# Patient Record
Sex: MALE | Race: WHITE | NOT HISPANIC OR LATINO | URBAN - METROPOLITAN AREA
[De-identification: names, ages, dates, MRNs, and addresses within clinical notes are randomized per-mention and may not be internally consistent; named-entity substitution may affect disease eponyms.]

---

## 2017-02-13 ENCOUNTER — OFFICE VISIT (OUTPATIENT)
Dept: FAMILY MEDICINE CLINIC | Facility: CLINIC | Age: 32
End: 2017-02-13

## 2017-02-13 VITALS
BODY MASS INDEX: 27.74 KG/M2 | DIASTOLIC BLOOD PRESSURE: 70 MMHG | WEIGHT: 183 LBS | SYSTOLIC BLOOD PRESSURE: 110 MMHG | RESPIRATION RATE: 16 BRPM | OXYGEN SATURATION: 98 % | HEART RATE: 80 BPM | HEIGHT: 68 IN

## 2017-02-13 DIAGNOSIS — Z20.2 EXPOSURE TO STD: ICD-10-CM

## 2017-02-13 DIAGNOSIS — D22.9 MULTIPLE ATYPICAL NEVI: ICD-10-CM

## 2017-02-13 DIAGNOSIS — Z00.00 ROUTINE ADULT HEALTH MAINTENANCE: Primary | ICD-10-CM

## 2017-02-13 LAB
ALBUMIN SERPL-MCNC: 4.8 G/DL (ref 3.5–5.2)
ALBUMIN/GLOB SERPL: 2.1 G/DL
ALP SERPL-CCNC: 63 U/L (ref 39–117)
ALT SERPL-CCNC: 31 U/L (ref 1–41)
AST SERPL-CCNC: 19 U/L (ref 1–40)
BASOPHILS # BLD AUTO: 0.03 10*3/MM3 (ref 0–0.2)
BASOPHILS NFR BLD AUTO: 0.4 % (ref 0–1.5)
BILIRUB SERPL-MCNC: 0.7 MG/DL (ref 0.1–1.2)
BUN SERPL-MCNC: 16 MG/DL (ref 6–20)
BUN/CREAT SERPL: 14.4 (ref 7–25)
CALCIUM SERPL-MCNC: 9.4 MG/DL (ref 8.6–10.5)
CHLORIDE SERPL-SCNC: 101 MMOL/L (ref 98–107)
CHOLEST SERPL-MCNC: 219 MG/DL (ref 0–200)
CO2 SERPL-SCNC: 25.7 MMOL/L (ref 22–29)
CREAT SERPL-MCNC: 1.11 MG/DL (ref 0.76–1.27)
EOSINOPHIL # BLD AUTO: 0.24 10*3/MM3 (ref 0–0.7)
EOSINOPHIL NFR BLD AUTO: 2.9 % (ref 0.3–6.2)
ERYTHROCYTE [DISTWIDTH] IN BLOOD BY AUTOMATED COUNT: 12.5 % (ref 11.5–14.5)
GLOBULIN SER CALC-MCNC: 2.3 GM/DL
GLUCOSE SERPL-MCNC: 125 MG/DL (ref 65–99)
HCT VFR BLD AUTO: 46.5 % (ref 40.4–52.2)
HDLC SERPL-MCNC: 50 MG/DL (ref 40–60)
HGB BLD-MCNC: 15.4 G/DL (ref 13.7–17.6)
IMM GRANULOCYTES # BLD: 0 10*3/MM3 (ref 0–0.03)
IMM GRANULOCYTES NFR BLD: 0 % (ref 0–0.5)
LDLC SERPL CALC-MCNC: 141 MG/DL (ref 0–100)
LDLC/HDLC SERPL: 2.82 {RATIO}
LYMPHOCYTES # BLD AUTO: 2.28 10*3/MM3 (ref 0.9–4.8)
LYMPHOCYTES NFR BLD AUTO: 27.7 % (ref 19.6–45.3)
MCH RBC QN AUTO: 31.1 PG (ref 27–32.7)
MCHC RBC AUTO-ENTMCNC: 33.1 G/DL (ref 32.6–36.4)
MCV RBC AUTO: 93.9 FL (ref 79.8–96.2)
MONOCYTES # BLD AUTO: 0.27 10*3/MM3 (ref 0.2–1.2)
MONOCYTES NFR BLD AUTO: 3.3 % (ref 5–12)
NEUTROPHILS # BLD AUTO: 5.42 10*3/MM3 (ref 1.9–8.1)
NEUTROPHILS NFR BLD AUTO: 65.7 % (ref 42.7–76)
PLATELET # BLD AUTO: 228 10*3/MM3 (ref 140–500)
POTASSIUM SERPL-SCNC: 4.3 MMOL/L (ref 3.5–5.2)
PROT SERPL-MCNC: 7.1 G/DL (ref 6–8.5)
RBC # BLD AUTO: 4.95 10*6/MM3 (ref 4.6–6)
SODIUM SERPL-SCNC: 141 MMOL/L (ref 136–145)
TRIGL SERPL-MCNC: 139 MG/DL (ref 0–150)
TSH SERPL DL<=0.005 MIU/L-ACNC: 1.48 MIU/ML (ref 0.27–4.2)
VLDLC SERPL CALC-MCNC: 27.8 MG/DL (ref 5–40)
WBC # BLD AUTO: 8.24 10*3/MM3 (ref 4.5–10.7)

## 2017-02-13 PROCEDURE — 99395 PREV VISIT EST AGE 18-39: CPT | Performed by: NURSE PRACTITIONER

## 2017-02-13 NOTE — PATIENT INSTRUCTIONS
Moles  Moles are usually harmless growths on the skin. They are accumulations of color (pigment) cells in the skin that:   · Can be various colors, from light brown to black.  · Can appear anywhere on the body.  · May remain flat or become raised.  · May contain hairs.  · May remain smooth or develop wrinkling.  Most moles are not cancerous (benign). However, some moles may develop changes and become cancerous. It is important to check your moles every month. If you check your moles regularly, you will be able to notice any changes that may occur.   CAUSES   Moles occur when skin cells grow together in clusters instead of spreading out in the skin as they normally do. The reason for this clustering is unknown.  DIAGNOSIS   Your caregiver will perform a skin examination to diagnose your mole.   TREATMENT   Moles usually do not require treatment. If a mole becomes worrisome, your caregiver may choose to take a sample of the mole or remove it entirely, and then send it to a lab for examination.   HOME CARE INSTRUCTIONS  · Check your mole(s) monthly for changes that may indicate skin cancer. These changes can include:    A change in size.    A change in color. Note that moles tend to darken during pregnancy or when taking birth control pills (oral contraception).    A change in shape.    A change in the border of the mole.  · Wear sunscreen (with an SPF of at least 30) when you spend long periods of time outside. Reapply the sunscreen every 2-3 hours.  · Schedule annual appointments with your skin doctor (dermatologist) if you have a large number of moles.  SEEK MEDICAL CARE IF:  · Your mole changes size, especially if it becomes larger than a pencil eraser.  · Your mole changes in color or develops more than one color.   · Your mole becomes itchy or bleeds.  · Your mole, or the skin near the mole, becomes painful, sore, red, or swollen.  · Your mole becomes scaly, sheds skin, or oozes fluid.   · Your mole develops  irregular borders.  · Your mole becomes flat or develops raised areas.  · Your mole becomes hard or soft.      This information is not intended to replace advice given to you by your health care provider. Make sure you discuss any questions you have with your health care provider.     Document Released: 09/12/2002 Document Revised: 09/11/2013 Document Reviewed: 07/01/2013  [employer] Interactive Patient Education ©2016 [employer]

## 2017-02-13 NOTE — PROGRESS NOTES
"  Subjective   Cadnido Fowler  is a 31 y.o. male.   He is here for check up and RHM. Pt is a  at [employer]. Pt also would like STD testing - pt is sexually active with woman. Pt wants moles checked by dermatology.     History of Present Illness     The following portions of the patient's history were reviewed and updated as appropriate: allergies, current medications, past family history, past medical history, past social history, past surgical history and problem list.    Review of Systems   Constitutional: Negative.    HENT: Negative.    Eyes: Negative.    Respiratory: Negative.    Cardiovascular: Negative.    Gastrointestinal: Negative.    Endocrine: Negative.    Genitourinary: Negative.    Musculoskeletal: Negative for neck stiffness.   Skin: Negative.    Allergic/Immunologic: Negative.    Neurological: Negative.    Hematological: Negative.    Psychiatric/Behavioral: Negative.    All other systems reviewed and are negative.      Objective   Vitals:    02/13/17 0829   BP: 110/70   Pulse: 80   Resp: 16   SpO2: 98%   Weight: 183 lb (83 kg)   Height: 68\" (172.7 cm)     Physical Exam   Constitutional: He is oriented to person, place, and time. He appears well-developed and well-nourished.   HENT:   Head: Normocephalic and atraumatic.   Eyes: Conjunctivae and EOM are normal. Pupils are equal, round, and reactive to light.   Neck: Normal range of motion. Neck supple. No thyromegaly present.   Cardiovascular: Normal rate, regular rhythm, normal heart sounds and intact distal pulses.  Exam reveals no gallop and no friction rub.    No murmur heard.  Pulmonary/Chest: Effort normal and breath sounds normal. No respiratory distress. He has no wheezes. He has no rales. He exhibits no tenderness.   Abdominal: Soft, non-tender, non-distended.   Musculoskeletal: Upper and Lower extremities have normal range of motion. He exhibits no edema.   Neurological: He is alert and oriented to person, place, and time.   Skin: Skin " is warm and dry. No rash noted.   Psychiatric: He has a normal mood and affect. His behavior is normal. Judgment and thought content normal.   Nursing note and vitals reviewed.  Pt has multiple atypical nevi to back and no irregular or bleeding.     Assessment/Plan   Candido Fowler was seen today for annual exam.    Orders Placed This Encounter   Procedures   • C Trachomatis / N Gonorrhoeae PCR   • Comprehensive Metabolic Panel   • Lipid Panel With LDL / HDL Ratio   • TSH   • Hepatitis Panel, Acute   • HIV-1 / O / 2 Ag / Antibody 4th Generation   • RPR   • Ambulatory Referral to Dermatology     Referral Priority:   Routine     Referral Type:   Consultation     Referral Reason:   Specialty Services Required     Referred to Provider:   Guy Underwood MD     Requested Specialty:   Dermatology     Number of Visits Requested:   1

## 2017-02-14 LAB
HAV IGM SERPL QL IA: NEGATIVE
HBV CORE IGM SERPL QL IA: NEGATIVE
HBV SURFACE AG SERPL QL IA: NEGATIVE
HCV AB S/CO SERPL IA: <0.1 S/CO RATIO (ref 0–0.9)
HIV 1+2 AB+HIV1 P24 AG SERPL QL IA: NON REACTIVE

## 2017-02-15 LAB
C TRACH RRNA SPEC QL NAA+PROBE: NEGATIVE
N GONORRHOEA RRNA SPEC QL NAA+PROBE: NEGATIVE
RPR SER QL: NORMAL

## 2017-03-31 ENCOUNTER — OFFICE VISIT (OUTPATIENT)
Dept: FAMILY MEDICINE CLINIC | Facility: CLINIC | Age: 32
End: 2017-03-31

## 2017-03-31 VITALS
BODY MASS INDEX: 28.67 KG/M2 | DIASTOLIC BLOOD PRESSURE: 80 MMHG | OXYGEN SATURATION: 98 % | WEIGHT: 189.2 LBS | SYSTOLIC BLOOD PRESSURE: 115 MMHG | HEIGHT: 68 IN | HEART RATE: 72 BPM

## 2017-03-31 DIAGNOSIS — H01.005 BLEPHARITIS OF LEFT LOWER EYELID, UNSPECIFIED TYPE: Primary | ICD-10-CM

## 2017-03-31 DIAGNOSIS — H61.23 IMPACTED CERUMEN OF BOTH EARS: ICD-10-CM

## 2017-03-31 DIAGNOSIS — H92.03 OTALGIA OF BOTH EARS: ICD-10-CM

## 2017-03-31 PROCEDURE — 99213 OFFICE O/P EST LOW 20 MIN: CPT | Performed by: FAMILY MEDICINE

## 2017-03-31 PROCEDURE — 69209 REMOVE IMPACTED EAR WAX UNI: CPT | Performed by: FAMILY MEDICINE

## 2017-03-31 RX ORDER — ERYTHROMYCIN 5 MG/G
OINTMENT OPHTHALMIC EVERY 12 HOURS
Status: DISCONTINUED | OUTPATIENT
Start: 2017-03-31 | End: 2017-10-09

## 2017-03-31 RX ORDER — ERYTHROMYCIN 5 MG/G
OINTMENT OPHTHALMIC ONCE
Qty: 1 G | Refills: 0 | Status: SHIPPED | OUTPATIENT
Start: 2017-03-31 | End: 2017-03-31

## 2017-08-20 ENCOUNTER — APPOINTMENT (OUTPATIENT)
Dept: GENERAL RADIOLOGY | Facility: HOSPITAL | Age: 32
End: 2017-08-20

## 2017-08-20 ENCOUNTER — HOSPITAL ENCOUNTER (EMERGENCY)
Facility: HOSPITAL | Age: 32
Discharge: HOME | End: 2017-08-20
Attending: EMERGENCY MEDICINE | Admitting: EMERGENCY MEDICINE

## 2017-08-20 VITALS
RESPIRATION RATE: 16 BRPM | OXYGEN SATURATION: 99 % | HEART RATE: 84 BPM | WEIGHT: 210 LBS | SYSTOLIC BLOOD PRESSURE: 132 MMHG | DIASTOLIC BLOOD PRESSURE: 78 MMHG | HEIGHT: 68 IN | TEMPERATURE: 98 F | BODY MASS INDEX: 31.83 KG/M2

## 2017-08-20 DIAGNOSIS — M25.561 ACUTE PAIN OF RIGHT KNEE: Primary | ICD-10-CM

## 2017-08-20 PROCEDURE — 73560 X-RAY EXAM OF KNEE 1 OR 2: CPT

## 2017-08-20 PROCEDURE — 99284 EMERGENCY DEPT VISIT MOD MDM: CPT

## 2017-08-20 RX ORDER — HYDROCODONE BITARTRATE AND ACETAMINOPHEN 7.5; 325 MG/1; MG/1
1 TABLET ORAL ONCE
Status: COMPLETED | OUTPATIENT
Start: 2017-08-20 | End: 2017-08-20

## 2017-08-20 RX ORDER — HYDROCODONE BITARTRATE AND ACETAMINOPHEN 5; 325 MG/1; MG/1
1 TABLET ORAL EVERY 6 HOURS PRN
Qty: 12 TABLET | Refills: 0 | Status: SHIPPED | OUTPATIENT
Start: 2017-08-20 | End: 2017-10-09

## 2017-08-20 RX ADMIN — HYDROCODONE BITARTRATE AND ACETAMINOPHEN 1 TABLET: 7.5; 325 TABLET ORAL at 12:00

## 2017-08-20 NOTE — ED PROVIDER NOTES
"EMERGENCY DEPARTMENT ENCOUNTER    CHIEF COMPLAINT  Chief Complaint: Knee pain  History given by:Patient  History limited by:None  Room Number: 04/04  PMD: JOE Junior      HPI:  Pt is a 32 y.o. male who presents with severe R knee pain onset last night at work but thinks his pain is from general wear and tear not a work injury. Pt reports his knee feels \"stuck\" and he can not straighten it. Pt reports he was seen at an [facility] care center recently that gave him muscle relaxer and ibuprofen 600mg for pain with mild relief. Pt reports seeing a knee specialist 1 month ago who said he can do an MRI if his pain continues and he has an appointment tomorrow.    Duration: 15 hours  Timing:Constant  Location:R knee  Radiation:None  Quality:Sharp  Intensity/Severity:Severe  Progression:Worsening  Associated Symptoms:None  Aggravating Factors:Ambulating  Alleviating Factors:None  Previous Episodes:Pt reports he saw a knee specialist 1 month ago who told him to follow up again if his pain continued.  Treatment before arrival:Pt reports taking muscle relaxer's and Ibuprofen 2 weeks ago for pain.    MEDICAL RECORD REVIEW  Pt has a hx of alcohol dependence, substance abuse and lower back pain. Pt presents with knee pain with no known injury.    PAST MEDICAL HISTORY  Active Ambulatory Problems     Diagnosis Date Noted   • No Active Ambulatory Problems     Resolved Ambulatory Problems     Diagnosis Date Noted   • No Resolved Ambulatory Problems     Past Medical History:   Diagnosis Date   • Alcohol abuse    • Asthma    • Low back pain    • Shoulder injury        PAST SURGICAL HISTORY  History reviewed. No pertinent surgical history.    FAMILY HISTORY  Family History   Problem Relation Age of Onset   • Depression Mother    • Cancer Maternal Grandfather      Lung   • Parkinsonism Paternal Grandfather        SOCIAL HISTORY  Social History     Social History   • Marital status: Single     Spouse name: N/A   • Number of children: " N/A   • Years of education: N/A     Occupational History   • Not on file.     Social History Main Topics   • Smoking status: Never Smoker   • Smokeless tobacco: Not on file      Comment: may smoke cigar from time to time   • Alcohol use Yes      Comment: 3-4 beers or 1.5 oz burbon/day   • Drug use: Yes     Special: Marijuana   • Sexual activity: Defer     Other Topics Concern   • Not on file     Social History Narrative   • No narrative on file       ALLERGIES  Amoxicillin and Tetracyclines & related    REVIEW OF SYSTEMS  Review of Systems   Constitutional: Negative for chills and fever.   HENT: Negative for sore throat.    Gastrointestinal: Negative for nausea and vomiting.   Genitourinary: Negative for dysuria.   Musculoskeletal: Positive for arthralgias (R knee). Negative for back pain.   Skin: Negative for rash.   Psychiatric/Behavioral: The patient is not nervous/anxious.        PHYSICAL EXAM  ED Triage Vitals   Temp Heart Rate Resp BP SpO2   08/20/17 1040 08/20/17 1040 08/20/17 1040 08/20/17 1121 08/20/17 1040   97.8 °F (36.6 °C) 100 18 155/104 100 %      Temp src Heart Rate Source Patient Position BP Location FiO2 (%)   08/20/17 1040 08/20/17 1040 08/20/17 1121 08/20/17 1121 --   Tympanic Monitor Sitting Right arm        Physical Exam   Constitutional: He is oriented to person, place, and time and well-developed, well-nourished, and in no distress.   HENT:   Head: Normocephalic and atraumatic.   Eyes: EOM are normal. Pupils are equal, round, and reactive to light.   Neck: Normal range of motion. Neck supple.   Cardiovascular: Normal rate, regular rhythm and normal heart sounds.    Pulmonary/Chest: Effort normal and breath sounds normal. No respiratory distress.   Abdominal: Soft. There is no tenderness. There is no rebound and no guarding.   Musculoskeletal: He exhibits no edema.        Right shoulder: He exhibits no swelling and no effusion.        Right knee: He exhibits decreased range of motion (Secondary  to pain). He exhibits no effusion. Tenderness (At patellar tendon only) found.   No R knee effusion redness or warmth. Pain on exam does not match up with reported pain.       Neurological: He is alert and oriented to person, place, and time. He has normal sensation and normal strength.   Skin: Skin is warm and dry.   Psychiatric: Mood and affect normal.   Nursing note and vitals reviewed.      RADIOLOGY  XR Knee 1 or 2 View Right   Final Result       No acute fracture is identified. With persistent clinical indication,   MRI can be considered for further evaluation.       This report was finalized on 8/20/2017 12:05 PM by Dr. Norberto Serna MD.              I ordered the above noted radiological studies and reviewed the images on the PACS system.      COURSE & MEDICAL DECISION MAKING  Pertinent Imaging studies that were ordered and reviewed are noted above.  Results were reviewed/discussed with the patient and they were also made aware of online assess.  Pt also made aware that some labs, such as cultures, will not be resulted during ER visit and follow up with PMD is necessary.       PROGRESS AND CONSULTS    Progress Notes:    1145 Reviewed pt's history and workup with Dr. Hernandez.  After a bedside evaluation; Dr Hernandez agrees with the plan of care    1215 I have little suspicion for bucket-flap tear.  His knee exam was more suggestive of a patellar tendonitis.  He was just evaluated for this by an orthopedist (patient does not know the name) less than 2 months ago.  He will be given pain medicine for home.  I offered the patient an ace wrap or knee immobilizer.  He preferred the ace wrap.  I gave his medicine for here and home, as well as follow up with Dr. Gilbert.    The patient's history, physical exam, and lab findings were discussed with the physician, who also performed a face to face history and physical exam.  I discussed all results and noted any abnormalities with patient.  Discussed absoute  "need to recheck abnormalities with their family physician.  I answered any of the patient's questions.  Discussed plan for discharge, as there is no emergent indication for admission.  Pt is agreeable and understands need for follow up and repeat testing.  Pt is aware that discharge does not mean that nothing is wrong but it indicates no emergency is present and they must continue care with their family physician.  Pt is discharged with instructions to follow up with primary care doctor to have their blood pressure rechecked.     MEDICATIONS GIVEN IN ER  Medications   HYDROcodone-acetaminophen (NORCO) 7.5-325 MG per tablet 1 tablet (1 tablet Oral Given 8/20/17 1200)       /83 (BP Location: Right arm, Patient Position: Sitting)  Pulse 88  Temp 97.8 °F (36.6 °C) (Tympanic)   Resp 18  Ht 68\" (172.7 cm)  Wt 210 lb (95.3 kg)  SpO2 97%  BMI 31.93 kg/m2      DIAGNOSIS  Final diagnoses:   Acute pain of right knee       FOLLOW UP   Rula Gilbert, JOE  [address] [id number] [phone]          Guy Gilbert MD  [address] [address]  [phone]            RX     Medication List      New Prescriptions          HYDROcodone-acetaminophen 5-325 MG per tablet   Commonly known as:  NORCO   Take 1 tablet by mouth Every 6 (Six) Hours As Needed for Moderate Pain .           [facility] report [id number] reviewed.  Risks, benefits, alternatives discussed with patient.  Pt consents to treatment and agrees to follow up with PMD tomorrow for further care and any other prescriptions.       Documentation assistance provided by reina Ha for Belkys Albert PA-C.  Information recorded by the reina was done at my direction and has been verified and validated by me.     Kleber Ha  08/20/17 1202       Kleber Ha  08/20/17 1240       Belkys Albert PA-C  08/20/17 1817       Belkys Albert PA-C  10/02/17 9746    "

## 2017-08-20 NOTE — DISCHARGE INSTRUCTIONS
PLEASE READ AND REVIEW ALL DISCHARGE INSTRUCTIONS.     Please follow up with your primary care physician for any further evaluation/treatment and further management of your blood pressure.     Recheck in emergency department for any worsening and/or concerning symptoms.     Take all prescribed medicine as written and continue chronic medication.    Follow up with Dr. Gilbert, orthopedist, for further evaluation of your knee pain.  Wear ace wrap and use crutches as needed.    Pain medicine as needed.  DO NOT DRIVE WHILE TAKING PAIN MEDICINE.

## 2017-08-20 NOTE — ED NOTES
"Pt c/o right knee pain that started around 2300 last night at work. Pt states it started \"locking up\" and becoming stiff first and then the pain started. Pt denies any known injury. Pt states this happened to him 2-3 months ago.     Lashanda Knight RN  08/20/17 1121    "

## 2017-08-24 ENCOUNTER — TELEPHONE (OUTPATIENT)
Dept: SOCIAL WORK | Facility: HOSPITAL | Age: 32
End: 2017-08-24

## 2017-10-09 ENCOUNTER — OFFICE VISIT (OUTPATIENT)
Dept: FAMILY MEDICINE CLINIC | Facility: CLINIC | Age: 32
End: 2017-10-09

## 2017-10-09 VITALS
BODY MASS INDEX: 33.49 KG/M2 | HEART RATE: 96 BPM | SYSTOLIC BLOOD PRESSURE: 132 MMHG | HEIGHT: 68 IN | DIASTOLIC BLOOD PRESSURE: 86 MMHG | WEIGHT: 221 LBS | OXYGEN SATURATION: 98 %

## 2017-10-09 DIAGNOSIS — L60.0 INGROWN RIGHT GREATER TOENAIL: Primary | ICD-10-CM

## 2017-10-09 PROCEDURE — 99213 OFFICE O/P EST LOW 20 MIN: CPT | Performed by: NURSE PRACTITIONER

## 2017-10-09 NOTE — PATIENT INSTRUCTIONS
Ingrown Toenail  An ingrown toenail occurs when the corner or sides of your toenail grow into the surrounding skin. The big toe is most commonly affected, but it can happen to any of your toes. If your ingrown toenail is not treated, you will be at risk for infection.  CAUSES  This condition may be caused by:  · Wearing shoes that are too small or tight.  · Injury or trauma, such as stubbing your toe or having your toe stepped on.  · Improper cutting or care of your toenails.  · Being born with (congenital) nail or foot abnormalities, such as having a nail that is too big for your toe.  RISK FACTORS  Risk factors for an ingrown toenail include:  · Age. Your nails tend to thicken as you get older, so ingrown nails are more common in older people.  · Diabetes.  · Cutting your toenails incorrectly.  · Blood circulation problems.  SYMPTOMS  Symptoms may include:  · Pain, soreness, or tenderness.  · Redness.  · Swelling.  · Hardening of the skin surrounding the toe.  Your ingrown toenail may be infected if there is fluid, pus, or drainage.  DIAGNOSIS   An ingrown toenail may be diagnosed by medical history and physical exam. If your toenail is infected, your health care provider may test a sample of the drainage.  TREATMENT  Treatment depends on the severity of your ingrown toenail. Some ingrown toenails may be treated at home. More severe or infected ingrown toenails may require surgery to remove all or part of the nail. Infected ingrown toenails may also be treated with antibiotic medicines.  HOME CARE INSTRUCTIONS  · If you were prescribed an antibiotic medicine, finish all of it even if you start to feel better.  · Soak your foot in warm soapy water for 20 minutes, 3 times per day or as directed by your health care provider.  · Carefully lift the edge of the nail away from the sore skin by wedging a small piece of cotton under the corner of the nail. This may help with the pain.  Be careful not to cause more injury  to the area.  · Wear shoes that fit well. If your ingrown toenail is causing you pain, try wearing sandals, if possible.  · Trim your toenails regularly and carefully. Do not cut them in a curved shape. Cut your toenails straight across. This prevents injury to the skin at the corners of the toenail.  · Keep your feet clean and dry.  · If you are having trouble walking and are given crutches by your health care provider, use them as directed.  · Do not pick at your toenail or try to remove it yourself.  · Take medicines only as directed by your health care provider.  · Keep all follow-up visits as directed by your health care provider. This is important.  SEEK MEDICAL CARE IF:  · Your symptoms do not improve with treatment.  SEEK IMMEDIATE MEDICAL CARE IF:  · You have red streaks that start at your foot and go up your leg.  · You have a fever.  · You have increased redness, swelling, or pain.  · You have fluid, blood, or pus coming from your toenail.     This information is not intended to replace advice given to you by your health care provider. Make sure you discuss any questions you have with your health care provider.     Document Released: 12/15/2001 Document Revised: 05/03/2016 Document Reviewed: 11/11/2015  [employer] Interactive Patient Education ©2017 [employer]

## 2017-10-09 NOTE — PROGRESS NOTES
"Candido Fowler is a 32 y.o. male.Patient admits to pain and redness at the great toe on the right foot. Pain has been present for about 10 days. Using hydrogen peroxide. Pain is 7-8/10 with weight bearing.   Has not been soaking it, is a  by trade.    Seen 10/09/2017    Assessment/Plan   Problem List Items Addressed This Visit     None             No Follow-up on file.  There are no Patient Instructions on file for this visit.    Subjective     No chief complaint on file.    Social History   Substance Use Topics   • Smoking status: Never Smoker   • Smokeless tobacco: Not on file      Comment: may smoke cigar from time to time   • Alcohol use Yes      Comment: 3-4 beers or 1.5 oz burbon/day        History of Present Illness     The following portions of the patient's history were reviewed and updated as appropriate:PMHroutine: Social history , Allergies and Current Medications    Review of Systems   Constitutional: Positive for activity change.   Musculoskeletal: Positive for arthralgias.       Objective   Vitals:    10/09/17 1332   Weight: 221 lb (100 kg)   Height: 68\" (172.7 cm)     Body mass index is 33.6 kg/(m^2).  Physical Exam   Constitutional: He appears well-developed and well-nourished. No distress.   Pulmonary/Chest: Effort normal.   Musculoskeletal: Normal range of motion. He exhibits edema, tenderness and deformity.   Redness and pain to rt great toe lateral aspect of nail.   Neurological: He is alert.   Skin: Skin is warm.   Nursing note and vitals reviewed.    Reviewed Data:  No visits with results within 1 Month(s) from this visit.  Latest known visit with results is:    Office Visit on 02/13/2017   Component Date Value Ref Range Status   • WBC 02/13/2017 8.24  4.50 - 10.70 10*3/mm3 Final   • RBC 02/13/2017 4.95  4.60 - 6.00 10*6/mm3 Final   • Hemoglobin 02/13/2017 15.4  13.7 - 17.6 g/dL Final   • Hematocrit 02/13/2017 46.5  40.4 - 52.2 % Final   • MCV 02/13/2017 93.9  79.8 - 96.2 fL Final   • " MCH 02/13/2017 31.1  27.0 - 32.7 pg Final   • MCHC 02/13/2017 33.1  32.6 - 36.4 g/dL Final   • RDW 02/13/2017 12.5  11.5 - 14.5 % Final   • Platelets 02/13/2017 228  140 - 500 10*3/mm3 Final   • Neutrophil Rel % 02/13/2017 65.7  42.7 - 76.0 % Final   • Lymphocyte Rel % 02/13/2017 27.7  19.6 - 45.3 % Final   • Monocyte Rel % 02/13/2017 3.3* 5.0 - 12.0 % Final   • Eosinophil Rel % 02/13/2017 2.9  0.3 - 6.2 % Final   • Basophil Rel % 02/13/2017 0.4  0.0 - 1.5 % Final   • Neutrophils Absolute 02/13/2017 5.42  1.90 - 8.10 10*3/mm3 Final   • Lymphocytes Absolute 02/13/2017 2.28  0.90 - 4.80 10*3/mm3 Final   • Monocytes Absolute 02/13/2017 0.27  0.20 - 1.20 10*3/mm3 Final   • Eosinophils Absolute 02/13/2017 0.24  0.00 - 0.70 10*3/mm3 Final   • Basophils Absolute 02/13/2017 0.03  0.00 - 0.20 10*3/mm3 Final   • Immature Granulocyte Rel % 02/13/2017 0.0  0.0 - 0.5 % Final   • Immature Grans Absolute 02/13/2017 0.00  0.00 - 0.03 10*3/mm3 Final   • Glucose 02/13/2017 125* 65 - 99 mg/dL Final   • BUN 02/13/2017 16  6 - 20 mg/dL Final   • Creatinine 02/13/2017 1.11  0.76 - 1.27 mg/dL Final   • eGFR Non  Am 02/13/2017 77  >60 mL/min/1.73 Final   • eGFR African Am 02/13/2017 93  >60 mL/min/1.73 Final   • BUN/Creatinine Ratio 02/13/2017 14.4  7.0 - 25.0 Final   • Sodium 02/13/2017 141  136 - 145 mmol/L Final   • Potassium 02/13/2017 4.3  3.5 - 5.2 mmol/L Final   • Chloride 02/13/2017 101  98 - 107 mmol/L Final   • Total CO2 02/13/2017 25.7  22.0 - 29.0 mmol/L Final   • Calcium 02/13/2017 9.4  8.6 - 10.5 mg/dL Final   • Total Protein 02/13/2017 7.1  6.0 - 8.5 g/dL Final   • Albumin 02/13/2017 4.80  3.50 - 5.20 g/dL Final   • Globulin 02/13/2017 2.3  gm/dL Final   • A/G Ratio 02/13/2017 2.1  g/dL Final   • Total Bilirubin 02/13/2017 0.7  0.1 - 1.2 mg/dL Final   • Alkaline Phosphatase 02/13/2017 63  39 - 117 U/L Final   • AST (SGOT) 02/13/2017 19  1 - 40 U/L Final   • ALT (SGPT) 02/13/2017 31  1 - 41 U/L Final   • Total  Cholesterol 02/13/2017 219* 0 - 200 mg/dL Final   • Triglycerides 02/13/2017 139  0 - 150 mg/dL Final   • HDL Cholesterol 02/13/2017 50  40 - 60 mg/dL Final   • VLDL Cholesterol 02/13/2017 27.8  5 - 40 mg/dL Final   • LDL Cholesterol  02/13/2017 141* 0 - 100 mg/dL Final   • LDL/HDL Ratio 02/13/2017 2.82   Final   • TSH 02/13/2017 1.480  0.270 - 4.200 mIU/mL Final   • Chlamydia trachomatis, RODERICK 02/13/2017 Negative  Negative Final   • Neisseria gonorrhoeae, RODERICK 02/13/2017 Negative  Negative Final   • Hep A IgM 02/13/2017 Negative  Negative Final   • Hepatitis B Surface Ag 02/13/2017 Negative  Negative Final   • Hep B Core IgM 02/13/2017 Negative  Negative Final   • Hep C Virus Ab 02/13/2017 <0.1  0.0 - 0.9 s/co ratio Final    Comment:                                   Negative:     < 0.8                               Indeterminate: 0.8 - 0.9                                    Positive:     > 0.9   The CDC recommends that a positive HCV antibody result   be followed up with a HCV Nucleic Acid Amplification   test ([id number]).     • HIV Screen 4th Gen w/RFX (Referenc* 02/13/2017 Non Reactive  Non Reactive Final   • RPR 02/13/2017 Comment  Non-Reactive Final    Non-Reactive

## 2023-01-23 ENCOUNTER — OFFICE VISIT (OUTPATIENT)
Dept: FAMILY MEDICINE CLINIC | Facility: CLINIC | Age: 38
End: 2023-01-23
Payer: COMMERCIAL

## 2023-01-23 VITALS
HEIGHT: 68 IN | SYSTOLIC BLOOD PRESSURE: 138 MMHG | WEIGHT: 200 LBS | BODY MASS INDEX: 30.31 KG/M2 | DIASTOLIC BLOOD PRESSURE: 88 MMHG

## 2023-01-23 DIAGNOSIS — M54.50 ACUTE BILATERAL LOW BACK PAIN WITHOUT SCIATICA: ICD-10-CM

## 2023-01-23 DIAGNOSIS — M62.838 MUSCLE SPASM: ICD-10-CM

## 2023-01-23 DIAGNOSIS — Z13.228 SCREENING FOR METABOLIC DISORDER: ICD-10-CM

## 2023-01-23 DIAGNOSIS — R53.82 CHRONIC FATIGUE: ICD-10-CM

## 2023-01-23 DIAGNOSIS — Z13.220 SCREENING, LIPID: ICD-10-CM

## 2023-01-23 DIAGNOSIS — Z00.00 PREVENTATIVE HEALTH CARE: Primary | ICD-10-CM

## 2023-01-23 DIAGNOSIS — R59.0 CERVICAL ADENOPATHY: ICD-10-CM

## 2023-01-23 DIAGNOSIS — F43.9 STRESS: ICD-10-CM

## 2023-01-23 DIAGNOSIS — R06.83 SNORING: ICD-10-CM

## 2023-01-23 DIAGNOSIS — J30.2 SEASONAL ALLERGIES: ICD-10-CM

## 2023-01-23 DIAGNOSIS — H01.00B BLEPHARITIS OF BOTH UPPER AND LOWER EYELID OF LEFT EYE, UNSPECIFIED TYPE: ICD-10-CM

## 2023-01-23 PROBLEM — M25.561 PATELLOFEMORAL ARTHRALGIA OF RIGHT KNEE: Status: ACTIVE | Noted: 2017-06-01

## 2023-01-23 PROCEDURE — 99385 PREV VISIT NEW AGE 18-39: CPT | Performed by: NURSE PRACTITIONER

## 2023-01-23 PROCEDURE — 99213 OFFICE O/P EST LOW 20 MIN: CPT | Performed by: NURSE PRACTITIONER

## 2023-01-23 RX ORDER — MULTIPLE VITAMINS W/ MINERALS TAB 9MG-400MCG
TAB ORAL
COMMUNITY

## 2023-01-23 RX ORDER — TRIAMCINOLONE ACETONIDE 55 UG/1
2 SPRAY, METERED NASAL DAILY
Qty: 16.5 G | Refills: 11 | Status: SHIPPED | OUTPATIENT
Start: 2023-01-23 | End: 2024-01-23

## 2023-01-23 RX ORDER — CYCLOBENZAPRINE HCL 5 MG
5 TABLET ORAL NIGHTLY PRN
Qty: 30 TABLET | Refills: 0 | Status: SHIPPED | OUTPATIENT
Start: 2023-01-23 | End: 2023-02-22

## 2023-01-23 RX ORDER — AMOXICILLIN 250 MG
CAPSULE ORAL
COMMUNITY
End: 2023-01-23

## 2023-01-23 NOTE — PROGRESS NOTES
Subjective   Candido Fowler is a 37 y.o. male.     History of Present Illness   New patient to this provider and practice, here to establish primary care. Due annual exam. ER follow up for SOA 1/19/23, elevated CK and muscle cramping. Negative work up otherwise. Improved after IVF.    Hx of elevated CK. He has stopped drinking her preworkout. BUN 20, creat 1.2. He does cardio several times a week. He has cut back on his alcohol. He denies SOA today, no chest pain, palps, no edema. No significant cardio history in his family. No more muscle cramping at all.     Pt has some nagging joint pain daily, stiffness. He work out 5 days/week. He has some nagging L foot pain. No red or swollen joints. He has severe muscle spasms in tricept and lats daily on Left side. Sometimes he has to stop work and raise arms above his head. Low back pain, worse w standing at work. Would like PT referral     More persistent stress and anxiety over past year. Brain fog and fatigue worse in last 6 months.  He works as  and works long hours standing. Recent loss of grandmother. He dora like referral to get testosterone levels checked.     The following portions of the patient's history were reviewed and updated as appropriate: allergies, current medications, past family history, past medical history, past social history, past surgical history and problem list.    Review of Systems   Constitutional: Negative for activity change, fatigue and unexpected weight loss.   HENT: Negative for congestion.         Dental exam is utd     Eyes: Negative for blurred vision and visual disturbance.        Eye exam is utd   Respiratory: Negative for cough, shortness of breath and wheezing.    Cardiovascular: Negative for chest pain, palpitations and leg swelling.   Gastrointestinal: Negative for abdominal distention, blood in stool, constipation, diarrhea and GERD.   Endocrine: Negative for cold intolerance, heat intolerance, polydipsia, polyphagia and  polyuria.   Genitourinary: Negative for dysuria and urinary incontinence.   Musculoskeletal: Positive for arthralgias (L foot, r knee, low back , bilat shoulders ) and myalgias (Left lat and tri).   Skin: Negative for rash.   Allergic/Immunologic: Positive for environmental allergies.   Neurological: Negative for weakness and headache.   Hematological: Does not bruise/bleed easily.   Psychiatric/Behavioral: Positive for stress. Negative for sleep disturbance and depressed mood. The patient is nervous/anxious.        Objective   Physical Exam  Vitals reviewed.   Constitutional:       Appearance: Normal appearance. He is normal weight.   HENT:      Head: Normocephalic.      Right Ear: Tympanic membrane normal.      Left Ear: Tympanic membrane normal.      Nose: Nose normal.      Right Turbinates: Enlarged.      Left Turbinates: Enlarged.      Mouth/Throat:      Mouth: Mucous membranes are moist.      Tonsils: No tonsillar abscesses.   Eyes:      Pupils: Pupils are equal, round, and reactive to light.        Comments: Redness, dry   Neck:     Cardiovascular:      Rate and Rhythm: Normal rate and regular rhythm.      Pulses: Normal pulses.      Heart sounds: Normal heart sounds.   Pulmonary:      Effort: Pulmonary effort is normal.      Breath sounds: Normal breath sounds.   Abdominal:      General: Abdomen is flat. Bowel sounds are normal.      Palpations: Abdomen is soft.   Musculoskeletal:         General: Normal range of motion.      Cervical back: Normal range of motion.   Lymphadenopathy:      Cervical: Cervical adenopathy present.   Skin:     General: Skin is warm.   Neurological:      General: No focal deficit present.      Mental Status: He is alert.   Psychiatric:         Mood and Affect: Mood is anxious.         Vitals:    01/23/23 1246   BP: 138/88     Body mass index is 30.41 kg/m².    Procedures    Assessment & Plan   Problems Addressed this Visit    None  Visit Diagnoses     Preventative health care    -   Primary    Screening for metabolic disorder        Relevant Orders    Comprehensive metabolic panel    CBC & Differential    Screening, lipid        Relevant Orders    Lipid panel    Seasonal allergies        Relevant Orders    Ambulatory Referral to ENT (Otolaryngology)    Stress        Relevant Orders    Ambulatory Referral to ENT (Otolaryngology)    Ambulatory Referral to Urology    Snoring        Relevant Orders    Ambulatory Referral to ENT (Otolaryngology)    Cervical adenopathy        Blepharitis of both upper and lower eyelid of left eye, unspecified type        Acute bilateral low back pain without sciatica        Relevant Orders    Ambulatory Referral to Physical Therapy Evaluate and treat    Muscle spasm        Relevant Orders    Ambulatory Referral to Physical Therapy Evaluate and treat    Chronic fatigue        Relevant Orders    Ambulatory Referral to Urology      Diagnoses       Codes Comments    Preventative health care    -  Primary ICD-10-CM: Z00.00  ICD-9-CM: V70.0     Screening for metabolic disorder     ICD-10-CM: Z13.228  ICD-9-CM: V77.99     Screening, lipid     ICD-10-CM: Z13.220  ICD-9-CM: V77.91     Seasonal allergies     ICD-10-CM: J30.2  ICD-9-CM: 477.9     Stress     ICD-10-CM: F43.9  ICD-9-CM: V62.89     Snoring     ICD-10-CM: R06.83  ICD-9-CM: 786.09     Cervical adenopathy     ICD-10-CM: R59.0  ICD-9-CM: 785.6     Blepharitis of both upper and lower eyelid of left eye, unspecified type     ICD-10-CM: H01.00B  ICD-9-CM: 373.00     Acute bilateral low back pain without sciatica     ICD-10-CM: M54.50  ICD-9-CM: 724.2, 338.19     Muscle spasm     ICD-10-CM: M62.838  ICD-9-CM: 728.85     Chronic fatigue     ICD-10-CM: R53.82  ICD-9-CM: 780.79         Orders Placed This Encounter   Procedures   • Lipid panel     Standing Status:   Future     Standing Expiration Date:   1/23/2024     Order Specific Question:   Release to patient     Answer:   Routine Release   • Comprehensive metabolic panel      Standing Status:   Future     Standing Expiration Date:   1/23/2024     Order Specific Question:   Release to patient     Answer:   Routine Release   • Ambulatory Referral to ENT (Otolaryngology)     Referral Priority:   Routine     Referral Type:   Consultation     Referral Reason:   Specialty Services Required     Requested Specialty:   Otolaryngology     Number of Visits Requested:   1   • Ambulatory Referral to Physical Therapy Evaluate and treat     Referral Priority:   Routine     Referral Type:   Physical Therapy     Referral Reason:   Specialty Services Required     Requested Specialty:   Physical Therapy     Number of Visits Requested:   1   • Ambulatory Referral to Urology     Referral Priority:   Routine     Referral Type:   Consultation     Referral Reason:   Specialty Services Required     Requested Specialty:   Urology     Number of Visits Requested:   1   • CBC & Differential     Standing Status:   Future     Standing Expiration Date:   1/23/2024       Preventative care- Follow heart healthy diet, drink water, walk daily. Wear seatbelts, wear helmets, wear sunscreens. Follow CDC guidelines for covid pandemic.     Cervical lymphadenopathy- refer ENT, return for cbc recheck if no improvement    Seasonally allergies- cw xyzal, all nasocort, use baby shampoo on eyelids and eye lubricant     Snoring- refer ENt for snoring    OA/muscle spasms/LBP- tylenol or aleve as needed, muscle rubs, TENS unit. Refer PT. Take magnesium 500-1000 qd. Lowest dose cyclobenzaprine, do not drink alcohol and drive w this med, reviewed risks of sedation     Chronic fatigue- limit a;lcpohol and MJ, refer to uto for testosterone check, ENT for sleep study, PT for exercises      Education provided in AVS   Return in 6 months (on 7/23/2023), or if symptoms worsen or fail to improve, for Recheck.

## 2023-01-23 NOTE — PATIENT INSTRUCTIONS
Magnesium supplement 500 mg 1-2/day    Arthritis- tylenol, ibuprofen as needed  LOWEST dose muscle relaxer for spasms     Systane eye drops and baby shampoo for L eye